# Patient Record
Sex: FEMALE | Race: OTHER
[De-identification: names, ages, dates, MRNs, and addresses within clinical notes are randomized per-mention and may not be internally consistent; named-entity substitution may affect disease eponyms.]

---

## 2023-03-16 ENCOUNTER — OFFICE VISIT (OUTPATIENT)
Dept: PRIMARY CARE CLINIC | Facility: CLINIC | Age: 58
End: 2023-03-16

## 2023-03-16 VITALS
BODY MASS INDEX: 30.02 KG/M2 | DIASTOLIC BLOOD PRESSURE: 69 MMHG | RESPIRATION RATE: 14 BRPM | TEMPERATURE: 98.4 F | WEIGHT: 159 LBS | HEART RATE: 85 BPM | HEIGHT: 61 IN | SYSTOLIC BLOOD PRESSURE: 111 MMHG | OXYGEN SATURATION: 99 %

## 2023-03-16 DIAGNOSIS — E11.9 TYPE 2 DIABETES MELLITUS WITHOUT COMPLICATION, WITHOUT LONG-TERM CURRENT USE OF INSULIN (HCC): ICD-10-CM

## 2023-03-16 DIAGNOSIS — L03.116 CELLULITIS OF LEFT LOWER EXTREMITY: Primary | ICD-10-CM

## 2023-03-16 PROCEDURE — 99203 OFFICE O/P NEW LOW 30 MIN: CPT | Performed by: NURSE PRACTITIONER

## 2023-03-16 RX ORDER — MUPIROCIN CALCIUM 20 MG/G
CREAM TOPICAL
Qty: 15 G | Refills: 0 | Status: SHIPPED | OUTPATIENT
Start: 2023-03-16 | End: 2023-04-15

## 2023-03-16 RX ORDER — SULFAMETHOXAZOLE AND TRIMETHOPRIM 800; 160 MG/1; MG/1
1 TABLET ORAL 2 TIMES DAILY
Qty: 10 TABLET | Refills: 0 | Status: SHIPPED | OUTPATIENT
Start: 2023-03-16 | End: 2023-03-21

## 2023-03-16 RX ORDER — ATORVASTATIN CALCIUM 80 MG/1
80 TABLET, FILM COATED ORAL DAILY
COMMUNITY

## 2023-03-16 SDOH — ECONOMIC STABILITY: HOUSING INSECURITY
IN THE LAST 12 MONTHS, WAS THERE A TIME WHEN YOU DID NOT HAVE A STEADY PLACE TO SLEEP OR SLEPT IN A SHELTER (INCLUDING NOW)?: NO

## 2023-03-16 SDOH — ECONOMIC STABILITY: INCOME INSECURITY: HOW HARD IS IT FOR YOU TO PAY FOR THE VERY BASICS LIKE FOOD, HOUSING, MEDICAL CARE, AND HEATING?: NOT VERY HARD

## 2023-03-16 SDOH — ECONOMIC STABILITY: FOOD INSECURITY: WITHIN THE PAST 12 MONTHS, THE FOOD YOU BOUGHT JUST DIDN'T LAST AND YOU DIDN'T HAVE MONEY TO GET MORE.: NEVER TRUE

## 2023-03-16 SDOH — ECONOMIC STABILITY: FOOD INSECURITY: WITHIN THE PAST 12 MONTHS, YOU WORRIED THAT YOUR FOOD WOULD RUN OUT BEFORE YOU GOT MONEY TO BUY MORE.: NEVER TRUE

## 2023-03-16 ASSESSMENT — PATIENT HEALTH QUESTIONNAIRE - PHQ9
SUM OF ALL RESPONSES TO PHQ QUESTIONS 1-9: 0
1. LITTLE INTEREST OR PLEASURE IN DOING THINGS: 0
SUM OF ALL RESPONSES TO PHQ9 QUESTIONS 1 & 2: 0
2. FEELING DOWN, DEPRESSED OR HOPELESS: 0
SUM OF ALL RESPONSES TO PHQ QUESTIONS 1-9: 0

## 2023-03-16 NOTE — PROGRESS NOTES
Arpita Landin (: 1965) Interpretation provided by Rubin #[id number]    Patient presents with wound to left ankle. Pt was applying liquid to foot to remove calluses and accidentally spilled some on the ankle. Incident happened on . Liquid called \"the original extreme callus remover\". Pain w/ walking. Pain worse w/ movement. Denies discharge from the wound. Denies loss of ROM of ankle and foot. Diabetes   States BG has been controlled. Sees PCP q 6 mo. Takes medication daily. Foot Pain   This is a new problem. There has been a history of trauma. The problem has been gradually worsening (Worsing of redness, pain and swelling). The quality of the pain is described as aching (\"just hurting\"). The pain is at a severity of 9/10. Pertinent negatives include no fever, inability to bear weight (can wear all pt weight on it but is more painful w/ weight bearing) or numbness. Associated symptoms comments: Denies foot numbness, tingling or loss of sensation. . She has tried nothing for the symptoms. Her past medical history is significant for diabetes. Other  Associated symptoms include chills. Pertinent negatives include no fever or numbness. Chief Complaint   Patient presents with    Foot Pain    Other     Pt said she was cleaning her feet with a cleanser that was supposed to be used on the soles of her feet (callus remover) but she used it on her ankles and now has a sore on her ankle and her foot hurts. \"The original extreme callus no more extra strength\" is the brand of this cleanser. Reviewed and updated this visit by provider:  Tobacco  Allergies  Meds  Problems  Med Hx  Surg Hx  Fam Hx           Immunizations:  Immunization status: up to date and documented. Review of Systems:   Review of Systems   Constitutional:  Positive for chills. Negative for fever. Neurological:  Negative for numbness.       /69 (Site: Right Upper Arm, Position: Sitting, Cuff Size: Medium Adult)   Pulse 85   Temp 98.4 °F (36.9 °C)   Resp 14   Ht 5' 0.83\" (1.545 m)   Wt 159 lb (72.1 kg)   SpO2 99%   BMI 30.21 kg/m²     Physical Examination: Physical Exam  Vitals reviewed. Constitutional:       General: She is not in acute distress. Appearance: Normal appearance. She is not ill-appearing, toxic-appearing or diaphoretic. Cardiovascular:      Pulses:           Dorsalis pedis pulses are 2+ on the right side and 2+ on the left side. Posterior tibial pulses are 2+ on the right side and 2+ on the left side. Musculoskeletal:      Right lower leg: Normal.      Right ankle: Normal.      Left ankle: Tenderness present over the lateral malleolus. Normal range of motion. Legs:    Skin:     General: Skin is warm and dry. Neurological:      Mental Status: She is alert and oriented to person, place, and time. Psychiatric:         Mood and Affect: Mood normal.         Behavior: Behavior normal.        No results found for this visit on 03/16/23. Assessment/Plan:  1. Cellulitis of left lower extremity  -     sulfamethoxazole-trimethoprim (BACTRIM DS;SEPTRA DS) 800-160 MG per tablet; Take 1 tablet by mouth 2 times daily for 5 days, Disp-10 tablet, R-0Normal  -     mupirocin (BACTROBAN) 2 % cream; Apply 3 times daily for 7 days to affected area, Disp-15 g, R-0, Normal  - Advised to apply warm compresses/Epsom salt soaks or prn Ibuprofen/APAP if needed for pain. - Counseled patient on home care measures: cleanse the site with warm soap and water, pat dry, apply Bactroban antibiotic ointment   -  Reviewed red flag symptoms for which to contact office including fever, chills, worsening pain, redness, red streaks. - F/U if no improvement on antibiotics in the next 3-4 days or if s/s worsen or persist     2.  Type 2 diabetes mellitus without complication, without long-term current use of insulin (HCC)   Continue management, supportive shoes, daily foot exams and f/u w/ Pcp as advised. Return if symptoms worsen or fail to improve.       Brandan Flanagan, APRN - NP